# Patient Record
Sex: MALE | URBAN - METROPOLITAN AREA
[De-identification: names, ages, dates, MRNs, and addresses within clinical notes are randomized per-mention and may not be internally consistent; named-entity substitution may affect disease eponyms.]

---

## 2022-05-11 ENCOUNTER — NURSE TRIAGE (OUTPATIENT)
Dept: ADMINISTRATIVE | Facility: CLINIC | Age: 22
End: 2022-05-11

## 2022-05-11 NOTE — TELEPHONE ENCOUNTER
Pt was calling from [facility]. Pt informed that it was not apart of the [facility] Nursing program. Pt informed if he was having an emergency he needed to go to nearest Er. Pt verbalized understanding.     Reason for Disposition   Information only question and nurse able to answer    Protocols used: INFORMATION ONLY CALL - NO TRIAGE-A-[place]